# Patient Record
Sex: FEMALE | Race: AMERICAN INDIAN OR ALASKA NATIVE
[De-identification: names, ages, dates, MRNs, and addresses within clinical notes are randomized per-mention and may not be internally consistent; named-entity substitution may affect disease eponyms.]

---

## 2022-08-05 ENCOUNTER — HOSPITAL ENCOUNTER (EMERGENCY)
Dept: HOSPITAL 5 - ED | Age: 32
Discharge: HOME | End: 2022-08-05
Payer: SELF-PAY

## 2022-08-05 VITALS — SYSTOLIC BLOOD PRESSURE: 118 MMHG | DIASTOLIC BLOOD PRESSURE: 69 MMHG

## 2022-08-05 DIAGNOSIS — R10.31: Primary | ICD-10-CM

## 2022-08-05 DIAGNOSIS — R10.32: ICD-10-CM

## 2022-08-05 LAB
ALBUMIN SERPL-MCNC: 4.7 G/DL (ref 3.9–5)
ALT SERPL-CCNC: 10 UNITS/L (ref 7–56)
BASOPHILS # (AUTO): 0 K/MM3 (ref 0–0.1)
BASOPHILS NFR BLD AUTO: 0.3 % (ref 0–1.8)
BUN SERPL-MCNC: 6 MG/DL (ref 7–17)
BUN/CREAT SERPL: 10 %
CALCIUM SERPL-MCNC: 9 MG/DL (ref 8.4–10.2)
EOSINOPHIL # BLD AUTO: 0 K/MM3 (ref 0–0.4)
EOSINOPHIL NFR BLD AUTO: 0.2 % (ref 0–4.3)
HCT VFR BLD CALC: 36.7 % (ref 30.3–42.9)
HEMOLYSIS INDEX: 4
HGB BLD-MCNC: 12 GM/DL (ref 10.1–14.3)
LYMPHOCYTES # BLD AUTO: 1.4 K/MM3 (ref 1.2–5.4)
LYMPHOCYTES NFR BLD AUTO: 22 % (ref 13.4–35)
MCHC RBC AUTO-ENTMCNC: 33 % (ref 30–34)
MCV RBC AUTO: 92 FL (ref 79–97)
MONOCYTES # (AUTO): 0.5 K/MM3 (ref 0–0.8)
MONOCYTES % (AUTO): 8.2 % (ref 0–7.3)
MUCOUS THREADS #/AREA URNS HPF: (no result) /HPF
PH UR STRIP: 6 [PH] (ref 5–7)
PLATELET # BLD: 220 K/MM3 (ref 140–440)
RBC # BLD AUTO: 4 M/MM3 (ref 3.65–5.03)
RBC #/AREA URNS HPF: 2 /HPF (ref 0–6)
UROBILINOGEN UR-MCNC: 0.2 MG/DL (ref ?–2)
WBC #/AREA URNS HPF: < 1 /HPF (ref 0–6)

## 2022-08-05 PROCEDURE — 99283 EMERGENCY DEPT VISIT LOW MDM: CPT

## 2022-08-05 PROCEDURE — 84702 CHORIONIC GONADOTROPIN TEST: CPT

## 2022-08-05 PROCEDURE — 83690 ASSAY OF LIPASE: CPT

## 2022-08-05 PROCEDURE — 36415 COLL VENOUS BLD VENIPUNCTURE: CPT

## 2022-08-05 PROCEDURE — 93005 ELECTROCARDIOGRAM TRACING: CPT

## 2022-08-05 PROCEDURE — 85025 COMPLETE CBC W/AUTO DIFF WBC: CPT

## 2022-08-05 PROCEDURE — 81001 URINALYSIS AUTO W/SCOPE: CPT

## 2022-08-05 PROCEDURE — 80053 COMPREHEN METABOLIC PANEL: CPT

## 2022-08-05 NOTE — EMERGENCY DEPARTMENT REPORT
ED Abdominal Pain HPI





- General


Chief Complaint: Abdominal Pain


Stated Complaint: CHEST PAIN/BACK PAIN


Time Seen by Provider: 08/05/22 15:45


Source: patient


Mode of arrival: Ambulatory


Limitations: No Limitations





- History of Present Illness


Initial Comments: 





32-year-old black female with no past medical history presents to the emergency 

department for evaluation of 1 day history of abdominal pain.  She states that 

she woke up this morning with severe abdominal pain that she rated 10 on a 10 

point scale along with nausea.  She states that pain was so intense that she 

felt like she could not breathe or walk.  She denies fever, vomiting, dysuria, 

and vaginal discharge.


MD Complaint: abdominal pain


-: Sudden, This morning


Location: LLQ, RLQ


Radiation: none


Migration to: no migration


Severity scale (0 -10): 10


Quality: aching


Consistency: constant


Worsens With: movement, other (Urination)


Associated Symptoms: nausea.  denies: vomiting, diarrhea, fever, chills, 

dysuria, hematemesis, hematochezia, melena, hematuria, anorexia, syncope





- Related Data


LMP (females 10-50): 3 weeks


                                  Previous Rx's











 Medication  Instructions  Recorded  Last Taken  Type


 


Ketorolac [Toradol] 10 mg PO Q6H PRN #12 tab 08/05/22 Unknown Rx











                                    Allergies











Allergy/AdvReac Type Severity Reaction Status Date / Time


 


No Known Allergies Allergy   Unverified 08/05/22 09:36














ED Review of Systems


ROS: 


Stated complaint: CHEST PAIN/BACK PAIN


Other details as noted in HPI





Comment: All other systems reviewed and negative


Constitutional: denies: chills, fever, malaise


Respiratory: denies: shortness of breath


Cardiovascular: denies: chest pain, palpitations


Gastrointestinal: abdominal pain, nausea.  denies: vomiting, diarrhea, 

hematemesis, melena, hematochezia


Genitourinary: denies: urgency, dysuria, frequency, hematuria, discharge, abno

rmal menses


Musculoskeletal: denies: back pain


Skin: denies: rash, lesions


Neurological: weakness.  denies: headache





ED Past Medical Hx





- Past Medical History


Previous Medical History?: No





- Medications


Home Medications: 


                                Home Medications











 Medication  Instructions  Recorded  Confirmed  Last Taken  Type


 


Ketorolac [Toradol] 10 mg PO Q6H PRN #12 tab 08/05/22  Unknown Rx














ED Physical Exam





- General


Limitations: No Limitations


General appearance: alert, in no apparent distress





- Head


Head exam: Present: atraumatic, normocephalic





- Eye


Eye exam: Present: normal appearance.  Absent: conjunctival injection





- ENT


ENT exam: Present: normal exam





- Neck


Neck exam: Present: normal inspection, full ROM.  Absent: tenderness, 

lymphadenopathy





- Respiratory


Respiratory exam: Present: normal lung sounds bilaterally.  Absent: respiratory 

distress, wheezes, rales, rhonchi, stridor, chest wall tenderness





- Cardiovascular


Cardiovascular Exam: Present: regular rate, normal heart sounds





- GI/Abdominal


GI/Abdominal exam: Present: soft, tenderness (Bilateral lower quadrant), normal 

bowel sounds.  Absent: distended, guarding, rebound, rigid





- Extremities Exam


Extremities exam: Present: normal inspection, full ROM, normal capillary refill.

  Absent: tenderness, pedal edema, joint swelling, calf tenderness





- Back Exam


Back exam: Present: normal inspection.  Absent: CVA tenderness (R), CVA 

tenderness (L)





- Neurological Exam


Neurological exam: Present: alert, oriented X3





- Psychiatric


Psychiatric exam: Present: normal affect, normal mood





- Skin


Skin exam: Present: warm, dry, intact, normal color





ED Course


                                   Vital Signs











  08/05/22





  09:34


 


Temperature 98.4 F


 


Pulse Rate 69


 


Respiratory 18





Rate 


 


Blood Pressure 118/69





[Left] 


 


O2 Sat by Pulse 99





Oximetry 














ED Medical Decision Making





- Lab Data


Result diagrams: 


                                 08/05/22 12:44





                                 08/05/22 12:44





- Medical Decision Making








32-year-old black female with no past medical history presents to the emergency 

department for evaluation of 1 day history of abdominal pain.  She states that 

she woke up this morning with severe abdominal pain that she rated 10 on a 10 po

int scale along with nausea.  She states that pain was so intense that she felt 

like she could not breathe or walk.  She denies fever, vomiting, dysuria, and 

vaginal discharge.





CBC, CMP, lipase, and serum hCG within normal limits.  Patient was given Toradol

 and GI cocktail while in the emergency department.  While awaiting results of 

urine, patient decided that she wanted to be discharged home because she had 

already been there for several hours and stated that she was hungry and wanted 

to go home and eat.  Advised patient that she will be notified if she needed an 

antibiotic for urinary tract infection. Patient was discharged home with a 

prescription for Toradol and advised to follow-up with her primary care provider

 if no improvement or worsening symptoms or return to the emergency department 

as needed.  Urine when it resulted was without signs of infection but did have 

small amount of blood.  


Critical care attestation.: 


If time is entered above; I have spent that time in minutes in the direct care 

of this critically ill patient, excluding procedure time.








ED Disposition


Clinical Impression: 


Abdominal pain


Qualifiers:


 Abdominal location: lower abdomen, unspecified Qualified Code(s): R10.30 - 

Lower abdominal pain, unspecified





Disposition: 01 HOME / SELF CARE / HOMELESS


Is pt being admited?: No


Does the pt Need Aspirin: No


Condition: Stable


Instructions:  Abdominal Pain, Adult, Easy-to-Read, Abdominal Pain (ED)


Additional Instructions: 


Take medication as prescribed.  Your urine results have not posted, but if they 

come back positive for urinary tract infection, I will give you a call then call

 in some antibiotics.  Follow-up with your primary care provider if no 

improvement.  Return to the emergency department as needed.


Prescriptions: 


Ketorolac [Toradol] 10 mg PO Q6H PRN #12 tab


 PRN Reason: Pain


Referrals: 


STEFFEN JACKSON MD [Staff Physician] - 3-5 Days


Forms:  Work/School Release Form(ED)


Time of Disposition: 18:49

## 2022-08-08 NOTE — ELECTROCARDIOGRAPH REPORT
[facility]

                                       

Test Date:    2022               Test Time:    09:38:50

Pat Name:     NAS CALIXTO            Department:   

Patient ID:   [id number]          Room:          

Gender:       F                        Technician:   [id number]

:          [id number]               Requested By: SANAM TELLEZ

Order Number: [id number]             Reading MD:   Ketan Martin

                                 Measurements

Intervals                              Axis          

Rate:         68                       P:            76

ME:           144                      QRS:          79

QRSD:         77                       T:            49

QT:           376                                    

QTc:          399                                    

                           Interpretive Statements

Sinus rhythm

No previous ECG available for comparison

Electronically Signed On 2022 10:05:50 EDT by Ketan Martin